# Patient Record
Sex: MALE | Race: WHITE | URBAN - METROPOLITAN AREA
[De-identification: names, ages, dates, MRNs, and addresses within clinical notes are randomized per-mention and may not be internally consistent; named-entity substitution may affect disease eponyms.]

---

## 2017-10-10 NOTE — PATIENT DISCUSSION
(H25.13) Age-related nuclear cataract, bilateral - Assesment : Examination revealed cataract. Mild symptoms. Pt doing well with OTC readers. - Plan : Monitor for changes. Advised patient to call our office with decreased vision or increased symptoms. Updated GLRx given today. RTC in 1-2 years for Exam, sooner if problems or changes occur.

## 2018-10-01 NOTE — PATIENT DISCUSSION
Monitor for changes. Advised patient od condition of cataracts and discussed symptoms of worsening and becoming more bothersome. Pt to call our office with decreased vision or increased symptoms.

## 2018-10-01 NOTE — PATIENT DISCUSSION
(H25.13) Age-related nuclear cataract, bilateral - Assesment : Examination revealed cataract. Mild symptoms. - Plan : Monitor for changes. Advised patient od condition of cataracts and discussed symptoms of worsening and becoming more bothersome. Pt to call our office with decreased vision or increased symptoms. Updated GLRx given today. RTC in 1 year for Exam, sooner if problems or changes occur.

## 2019-03-11 ENCOUNTER — IMPORTED ENCOUNTER (OUTPATIENT)
Dept: URBAN - METROPOLITAN AREA CLINIC 50 | Facility: CLINIC | Age: 74
End: 2019-03-11

## 2019-03-12 ENCOUNTER — IMPORTED ENCOUNTER (OUTPATIENT)
Dept: URBAN - METROPOLITAN AREA CLINIC 50 | Facility: CLINIC | Age: 74
End: 2019-03-12

## 2019-03-26 ENCOUNTER — IMPORTED ENCOUNTER (OUTPATIENT)
Dept: URBAN - METROPOLITAN AREA CLINIC 50 | Facility: CLINIC | Age: 74
End: 2019-03-26

## 2019-04-12 ENCOUNTER — IMPORTED ENCOUNTER (OUTPATIENT)
Dept: URBAN - METROPOLITAN AREA CLINIC 50 | Facility: CLINIC | Age: 74
End: 2019-04-12

## 2019-04-24 ENCOUNTER — IMPORTED ENCOUNTER (OUTPATIENT)
Dept: URBAN - METROPOLITAN AREA CLINIC 50 | Facility: CLINIC | Age: 74
End: 2019-04-24

## 2019-04-24 NOTE — PATIENT DISCUSSION
"""S/P IOL OS: Tecnis ZCB00 18 (ORA) +ORA/Femto/Arcs +Omidria. Continue post operative instructions and drops per schedule.  """

## 2019-04-29 ENCOUNTER — IMPORTED ENCOUNTER (OUTPATIENT)
Dept: URBAN - METROPOLITAN AREA CLINIC 50 | Facility: CLINIC | Age: 74
End: 2019-04-29

## 2019-05-08 ENCOUNTER — IMPORTED ENCOUNTER (OUTPATIENT)
Dept: URBAN - METROPOLITAN AREA CLINIC 50 | Facility: CLINIC | Age: 74
End: 2019-05-08

## 2019-05-08 NOTE — PATIENT DISCUSSION
"""S/P IOL OD: Tecnis ZCB00 19.5 (Target: Near) +Femto/Arcs +Omidria. Continue post operative instructions and drops per schedule.  """

## 2019-05-13 ENCOUNTER — IMPORTED ENCOUNTER (OUTPATIENT)
Dept: URBAN - METROPOLITAN AREA CLINIC 50 | Facility: CLINIC | Age: 74
End: 2019-05-13

## 2019-06-13 ENCOUNTER — IMPORTED ENCOUNTER (OUTPATIENT)
Dept: URBAN - METROPOLITAN AREA CLINIC 50 | Facility: CLINIC | Age: 74
End: 2019-06-13

## 2019-06-13 NOTE — PATIENT DISCUSSION
"""S/P IOL OU: OD: Tecnis ZCB00 19.5 (Target: Near)Femto/ArcsOmidria. OS: Tecnis ZCB00 18 (ORA) +ORA/Arcs. "

## 2019-07-09 ENCOUNTER — IMPORTED ENCOUNTER (OUTPATIENT)
Dept: URBAN - METROPOLITAN AREA CLINIC 50 | Facility: CLINIC | Age: 74
End: 2019-07-09

## 2019-07-15 ENCOUNTER — IMPORTED ENCOUNTER (OUTPATIENT)
Dept: URBAN - METROPOLITAN AREA CLINIC 50 | Facility: CLINIC | Age: 74
End: 2019-07-15

## 2021-04-17 ASSESSMENT — VISUAL ACUITY
OD_PH: 20/40
OS_CC: 20/50
OS_CC: 20/60
OD_PH: 20/30+
OD_CC: 20/40
OS_SC: 20/30
OD_BAT: 20/400
OD_OTHER: 20/50. >20/400.
OD_SC: 20/200
OD_SC: 20/400
OD_OTHER: 20/400.
OS_CC: 20/50
OS_CC: J1+
OS_CC: J2
OS_BAT: 20/40
OS_OTHER: 20/40. 20/50.
OD_CC: J1+
OD_BAT: 20/400
OD_OTHER: 20/400.
OD_CC: J2
OS_CC: J1
OS_BAT: 20/400
OS_CC: 20/30
OD_BAT: 20/50
OD_CC: J1
OS_OTHER: 20/400.
OS_PH: 20/25+1
OD_CC: 20/30
OS_CC: 20/40
OD_SC: 20/100
OD_CC: 20/30+-
OS_SC: 20/30+3

## 2021-04-17 ASSESSMENT — TONOMETRY
OD_IOP_MMHG: 14
OS_IOP_MMHG: 10
OD_IOP_MMHG: 14
OD_IOP_MMHG: 26
OS_IOP_MMHG: 12
OS_IOP_MMHG: 14
OS_IOP_MMHG: 10
OD_IOP_MMHG: 11
OS_IOP_MMHG: 10
OS_IOP_MMHG: 21
OS_IOP_MMHG: 12
OD_IOP_MMHG: 10
OD_IOP_MMHG: 11
OD_IOP_MMHG: 13
OS_IOP_MMHG: 16
OS_IOP_MMHG: 9
OS_IOP_MMHG: 12
OS_IOP_MMHG: 23
OD_IOP_MMHG: 12
OD_IOP_MMHG: 16
OD_IOP_MMHG: 12
OD_IOP_MMHG: 25
OS_IOP_MMHG: 8
OD_IOP_MMHG: 9
OD_IOP_MMHG: 13
OS_IOP_MMHG: 11

## 2021-04-17 ASSESSMENT — PACHYMETRY
OD_CT_UM: 572
OS_CT_UM: 582
OD_CT_UM: 572
OS_CT_UM: 582
OD_CT_UM: 572
OS_CT_UM: 582
OD_CT_UM: 572
OS_CT_UM: 582
OD_CT_UM: 572
OS_CT_UM: 582
OD_CT_UM: 572
OD_CT_UM: 572
OS_CT_UM: 582
OD_CT_UM: 572

## 2022-01-17 ENCOUNTER — APPOINTMENT (RX ONLY)
Dept: URBAN - METROPOLITAN AREA CLINIC 58 | Facility: CLINIC | Age: 77
Setting detail: DERMATOLOGY
End: 2022-01-17

## 2022-01-17 DIAGNOSIS — L60.8 OTHER NAIL DISORDERS: ICD-10-CM | Status: STABLE

## 2022-01-17 PROCEDURE — ? ADDITIONAL NOTES

## 2022-01-17 PROCEDURE — ? COUNSELING

## 2022-01-17 PROCEDURE — ? FULL BODY SKIN EXAM - DECLINED

## 2022-01-17 PROCEDURE — ? TREATMENT REGIMEN

## 2022-01-17 PROCEDURE — 99202 OFFICE O/P NEW SF 15 MIN: CPT

## 2022-01-17 ASSESSMENT — LOCATION DETAILED DESCRIPTION DERM
LOCATION DETAILED: LEFT MIDDLE FINGERNAIL
LOCATION DETAILED: RIGHT INDEX FINGERNAIL
LOCATION DETAILED: RIGHT THUMBNAIL
LOCATION DETAILED: RIGHT RING FINGERNAIL
LOCATION DETAILED: LEFT RING FINGERNAIL
LOCATION DETAILED: LEFT THUMBNAIL
LOCATION DETAILED: RIGHT SMALL FINGERNAIL
LOCATION DETAILED: LEFT INDEX FINGERNAIL
LOCATION DETAILED: LEFT SMALL FINGERNAIL
LOCATION DETAILED: RIGHT MIDDLE FINGERNAIL

## 2022-01-17 ASSESSMENT — LOCATION SIMPLE DESCRIPTION DERM
LOCATION SIMPLE: LEFT INDEX FINGER
LOCATION SIMPLE: RIGHT MIDDLE FINGER
LOCATION SIMPLE: LEFT SMALL FINGER
LOCATION SIMPLE: LEFT RING FINGER
LOCATION SIMPLE: RIGHT RING FINGER
LOCATION SIMPLE: RIGHT INDEX FINGER
LOCATION SIMPLE: LEFT THUMB
LOCATION SIMPLE: RIGHT HAND
LOCATION SIMPLE: LEFT MIDDLE FINGER
LOCATION SIMPLE: RIGHT SMALL FINGER

## 2022-01-17 ASSESSMENT — LOCATION ZONE DERM: LOCATION ZONE: FINGERNAIL

## 2022-01-17 NOTE — PROCEDURE: TREATMENT REGIMEN
Continue Regimen: Collagen and biotin supplements
Initiate Treatment: Multivitamin
Plan: Pts mother did have history of psoriasis
Detail Level: Generalized